# Patient Record
Sex: FEMALE | Race: OTHER
[De-identification: names, ages, dates, MRNs, and addresses within clinical notes are randomized per-mention and may not be internally consistent; named-entity substitution may affect disease eponyms.]

---

## 2019-04-16 ENCOUNTER — HOSPITAL ENCOUNTER (EMERGENCY)
Dept: HOSPITAL 72 - EMR | Age: 29
Discharge: HOME | End: 2019-04-16
Payer: MEDICAID

## 2019-04-16 VITALS — HEIGHT: 61 IN | WEIGHT: 120 LBS | BODY MASS INDEX: 22.66 KG/M2

## 2019-04-16 VITALS — SYSTOLIC BLOOD PRESSURE: 119 MMHG | DIASTOLIC BLOOD PRESSURE: 96 MMHG

## 2019-04-16 VITALS — DIASTOLIC BLOOD PRESSURE: 96 MMHG | SYSTOLIC BLOOD PRESSURE: 119 MMHG

## 2019-04-16 DIAGNOSIS — T24.032A: Primary | ICD-10-CM
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
## 2019-04-16 PROCEDURE — 99282 EMERGENCY DEPT VISIT SF MDM: CPT
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
## 2019-04-16 NOTE — NUR
ED Nurse Note:





pt cleared to be d/c per ER provider, pt discharge and aftercare instruction 
provided w/ prescription, pt education done via discussion and handout, pt 
advised to follow up with pcp or return to ed if sx worsen or new sx develop, 
pt verbalized understanding and agrees with plan, vss, ambulatory w/steady 
gait, left w/ all belongings. pt accompanied by boyfriend, pt wound care done.
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
